# Patient Record
Sex: MALE | Race: WHITE
[De-identification: names, ages, dates, MRNs, and addresses within clinical notes are randomized per-mention and may not be internally consistent; named-entity substitution may affect disease eponyms.]

---

## 2020-09-09 ENCOUNTER — HOSPITAL ENCOUNTER (OUTPATIENT)
Dept: HOSPITAL 92 - SCSMRI | Age: 35
Discharge: HOME | End: 2020-09-09
Attending: FAMILY MEDICINE
Payer: COMMERCIAL

## 2020-09-09 DIAGNOSIS — M23.91: Primary | ICD-10-CM

## 2020-09-09 DIAGNOSIS — M23.203: ICD-10-CM

## 2020-09-09 DIAGNOSIS — M17.11: ICD-10-CM

## 2020-09-09 DIAGNOSIS — M23.200: ICD-10-CM

## 2020-09-09 NOTE — MRI
MRI OF THE RIGHT KNEE WITHOUT CONTRAST:



INDICATION: 

History of acute right knee pain and history of surgery in 2001.



COMPARISON: 

Right knee radiograph dated 7/24/2020.



FINDINGS: 

There is postsurgical change consistent with a prior ACL reconstruction. The ACL graft is completely 
disrupted. There is some mild degenerative intrasubstance signal seen within the PCL. There is a

large cystic abnormality seen along the ACL graft tract of the proximal tibia measuring 4.6 x 2.6 x 3
.1 cm. There is a horizontally oriented tear involving the body of the lateral meniscus with a more

horizontally oriented oblique tear seen involving the posterior junction and posterior horn. There is
 a very complex macerated appearing tear involving the body and posterior horn of the medial

meniscus. There is diffuse moderate chondrosis involving the femoral tibial compartments. There are s
mall marginal osteophytes affecting the femoral tibial compartments. There is a near full-thickness

delaminating articular cartilage tear involving the median patellar ridge and lateral patellar facet 
measuring 1.4 cm on image 19 of series 3. There are marginal osteophytes affecting the

patellofemoral compartment. There is an intraarticular body seen within the lateral aspect of the sup
rapatellar pouch measuring 8 mm. No popliteal cyst is evident. The extensor mechanism is intact.

The IT band appears within normal limits. The popliteus appears within normal limits.



IMPRESSION:

1. No postprocedural change consistent with a prior anterior cruciate ligament reconstruction with co
mplete disruption of the anterior cruciate ligament graft. There is moderate mucoid degeneration of

the posterior cruciate ligament.

2. Mild to moderate secondary osteoarthritic change involving the right knee with degenerative, compl
ex tears involving the medial and lateral menisci. Small intra-articular bodies seen within the

lateral suprapatellar pouch.



Transcribed Date/Time: 9/9/2020 10:32 AM



Reported By: Leonard Thomas 

Electronically Signed:  9/9/2020 10:35 AM